# Patient Record
Sex: FEMALE | Race: WHITE
[De-identification: names, ages, dates, MRNs, and addresses within clinical notes are randomized per-mention and may not be internally consistent; named-entity substitution may affect disease eponyms.]

---

## 2021-08-15 ENCOUNTER — HOSPITAL ENCOUNTER (EMERGENCY)
Dept: HOSPITAL 60 - LB.ED | Age: 51
Discharge: HOME | End: 2021-08-15
Payer: MEDICAID

## 2021-08-15 VITALS — HEART RATE: 126 BPM | SYSTOLIC BLOOD PRESSURE: 122 MMHG | DIASTOLIC BLOOD PRESSURE: 98 MMHG

## 2021-08-15 DIAGNOSIS — M54.5: Primary | ICD-10-CM

## 2021-08-15 PROCEDURE — 72110 X-RAY EXAM L-2 SPINE 4/>VWS: CPT

## 2021-08-15 PROCEDURE — 96372 THER/PROPH/DIAG INJ SC/IM: CPT

## 2021-08-15 PROCEDURE — 99283 EMERGENCY DEPT VISIT LOW MDM: CPT

## 2021-08-15 NOTE — EDM.PDOC
ED HPI GENERAL MEDICAL PROBLEM





- General


Chief Complaint: Back Pain or Injury


Stated Complaint: BACK PAIN


Time Seen by Provider: 08/15/21 15:40


Source of Information: Reports: Patient


History Limitations: Reports: No Limitations





- History of Present Illness


INITIAL COMMENTS - FREE TEXT/NARRATIVE: 





patient presented to the ER due to worsening of her lower back pain.





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 

back pain, and tightness that is worse by the end of the day. 





She also feels very stiff in the morning when she tried to get out of the bed. 





She tried ibuprofen and Flexeril with minimal relief.





she denies a fall or a trauma. 





No numbness or tingling.





she has seen a chiropractor last week - and reports that she didn't have much 

improvement 


Onset: Gradual


Duration: Week(s): (1)


Location: Reports: Back


Quality: Reports: Ache, Throbbing


Severity: Severe


Improves with: Reports: Immobilization, Medication, Rest


Worsens with: Reports: Movement


Context: Reports: Activity


Associated Symptoms: Reports: No Other Symptoms


Treatments PTA: Reports: Heat Therapy, NSAIDS


  ** Lower Back


Pain Score (Numeric/FACES): 1





- Related Data


                                    Allergies











Allergy/AdvReac Type Severity Reaction Status Date / Time


 


No Known Allergies Allergy   Verified 08/15/21 15:36











Home Meds: 


                                    Home Meds





Cyclobenzaprine [Flexeril] 10 mg PO TID 08/15/21 [History]


Ibuprofen 400 mg PO Q8HR 08/15/21 [History]











Past Medical History


Musculoskeletal History: Reports: Other (See Below)


Other Musculoskeletal History: back pain





- Past Surgical History


Other HEENT Surgeries/Procedures: Neck Surgery


Other Musculoskeletal Surgeries/Procedures:: Degenerative Disc Disease - Neck 

Fusion, broken leg x 2.





Social & Family History





- Tobacco Use


Packs/Tins Daily: 0.2





- Recreational Drug Use


Recreational Drug Use: No





ED ROS GENERAL





- Review of Systems


Review Of Systems: See Below


Constitutional: Reports: No Symptoms


HEENT: Reports: No Symptoms


Respiratory: Reports: No Symptoms


Cardiovascular: Reports: No Symptoms


GI/Abdominal: Reports: No Symptoms


Musculoskeletal: Reports: Back Pain


Skin: Reports: No Symptoms


Neurological: Reports: No Symptoms


Psychiatric: Reports: No Symptoms





ED EXAM,LOWER BACK PAIN/INJURY





- Physical Exam


Exam: See Below


Exam Limited By: No Limitations


General Appearance: Alert, WD/WN, No Apparent Distress


Eye Exam: Bilateral Eye: EOMI


Head: Atraumatic


Respiratory/Chest: No Respiratory Distress


Cardiovascular: Normal Peripheral Pulses


Back Exam: Normal Inspection, Decreased Range of Motion, Muscle Spasm, 

Paraspinal Tenderness


Neurological: Alert, Normal Mood/Affect, No Motor/Sensory Deficits, Oriented x 3


Psychiatric: Normal Affect





Course





- Vital Signs


Last Recorded V/S: 


                                Last Vital Signs











Temp  36.3 C   08/15/21 15:43


 


Pulse  126 H  08/15/21 15:43


 


Resp  18   08/15/21 15:43


 


BP  122/98 H  08/15/21 15:43


 


Pulse Ox  98   08/15/21 15:43














- Orders/Labs/Meds


Orders: 


                               Active Orders 24 hr











 Category Date Time Status


 


 Lumbar Spine Min 4V [CR] Stat Exams  08/15/21 15:56 Taken











Meds: 


Medications














Discontinued Medications














Generic Name Dose Route Start Last Admin





  Trade Name Freq  PRN Reason Stop Dose Admin


 


Ketorolac Tromethamine  60 mg  08/15/21 15:56  08/15/21 16:02





  Ketorolac 60 Mg/2 Ml Sdv  IM  08/15/21 15:57  60 mg





  ONETIME ONE   Administration


 


Orphenadrine Citrate  60 mg  08/15/21 15:56  08/15/21 16:03





  Orphenadrine 60 Mg/2 Ml Inj  IM  08/15/21 15:57  60 mg





  ONETIME ONE   Administration














- Re-Assessments/Exams


Free Text/Narrative Re-Assessment/Exam: 


received IM Toradol and Norflex 60mg 








Lumbar xrays - mild arthrolithiasis L5-S1, but not acute fractures








reports improvement in symptoms 











Departure





- Departure


Time of Disposition: 16:46


Disposition: Home, Self-Care 01


Condition: Good


Clinical Impression: 


Acute low back pain


Qualifiers:


 Back pain laterality: bilateral Sciatica presence: without sciatica Qualified 

Code(s): M54.5 - Low back pain








- Discharge Information


*PRESCRIPTION DRUG MONITORING PROGRAM REVIEWED*: Not Applicable


*COPY OF PRESCRIPTION DRUG MONITORING REPORT IN PATIENT [facility]: Not Applicable


Instructions:  Muscle Strain, Easy-to-Read, Acute Back Pain, Adult, Managing 

Pain Without Opioids


Forms:  ED Department Discharge





Sepsis Event Note (ED)





- Evaluation


Sepsis Screening Result: No Definite Risk





- Focused Exam


Vital Signs: 


                                   Vital Signs











  Temp Pulse Resp BP Pulse Ox


 


 08/15/21 15:43  36.3 C  126 H  18  122/98 H  98














- Problem List & Annotations


(1) Acute low back pain


SNOMED Code(s): 596939573


   Code(s): M54.5 - LOW BACK PAIN   Status: Acute   Priority: Low   Current 

Visit: Yes   


Qualifiers: 


   Back pain laterality: bilateral   Sciatica presence: without sciatica   

Qualified Code(s): M54.5 - Low back pain   





- Problem List Review


Problem List Initiated/Reviewed/Updated: Yes





- My Orders


Last 24 Hours: 


My Active Orders





08/15/21 15:56


Lumbar Spine Min 4V [CR] Stat 














- Assessment/Plan


Last 24 Hours: 


My Active Orders





08/15/21 15:56


Lumbar Spine Min 4V [CR] Stat 











Plan: 





- take muscle relaxants as prescribed





- apply heating pads on the affected area to relax the muscles





- follow up with your PCP and chiropractor in 1-2 weeks to discuss back pain 

management 





- recommend muscle stretching exercises in the floor

## 2021-08-16 NOTE — CR
Date of Service:  08/15/21

Clinical Data:  pain 



LUMBAR SPINE:  



The vertebral bodies are of average height and in good alignment.  No acute 
fracture or dislocation.  



There is mild degenerative disk disease at multiple levels.  There is mild facet
joint hypertrophy in the lower lumbar spine.  



There is a moderate amount of stool noted within the visualized colon.  



No other significant findings.  



[id number]

[facility]